# Patient Record
Sex: FEMALE | URBAN - NONMETROPOLITAN AREA
[De-identification: names, ages, dates, MRNs, and addresses within clinical notes are randomized per-mention and may not be internally consistent; named-entity substitution may affect disease eponyms.]

---

## 2017-10-05 ENCOUNTER — APPOINTMENT (RX ONLY)
Dept: URBAN - NONMETROPOLITAN AREA CLINIC 22 | Facility: CLINIC | Age: 62
Setting detail: DERMATOLOGY
End: 2017-10-05

## 2017-10-05 DIAGNOSIS — L71.8 OTHER ROSACEA: ICD-10-CM

## 2017-10-05 PROCEDURE — ? TREATMENT REGIMEN

## 2017-10-05 PROCEDURE — ? EDUCATIONAL RESOURCES PROVIDED

## 2017-10-05 PROCEDURE — 99202 OFFICE O/P NEW SF 15 MIN: CPT

## 2017-10-05 PROCEDURE — ? COUNSELING

## 2017-10-05 PROCEDURE — ? FOLLOW UP FOR NEXT VISIT

## 2017-10-05 ASSESSMENT — LOCATION SIMPLE DESCRIPTION DERM
LOCATION SIMPLE: RIGHT CHEEK
LOCATION SIMPLE: RIGHT LIP
LOCATION SIMPLE: LEFT CHEEK
LOCATION SIMPLE: LEFT LIP
LOCATION SIMPLE: NOSE

## 2017-10-05 ASSESSMENT — LOCATION ZONE DERM
LOCATION ZONE: FACE
LOCATION ZONE: LIP
LOCATION ZONE: NOSE

## 2017-10-05 ASSESSMENT — LOCATION DETAILED DESCRIPTION DERM
LOCATION DETAILED: LEFT UPPER CUTANEOUS LIP
LOCATION DETAILED: NASAL SUPRATIP
LOCATION DETAILED: LEFT INFERIOR CENTRAL MALAR CHEEK
LOCATION DETAILED: RIGHT UPPER CUTANEOUS LIP
LOCATION DETAILED: RIGHT INFERIOR MEDIAL MALAR CHEEK

## 2017-10-05 ASSESSMENT — SEVERITY ASSESSMENT OVERALL AMONG ALL PATIENTS
IN YOUR EXPERIENCE, AMONG ALL PATIENTS YOU HAVE SEEN WITH THIS CONDITION, HOW SEVERE IS THIS PATIENT'S CONDITION?: MILD TO MODERATE

## 2017-10-05 NOTE — PROCEDURE: TREATMENT REGIMEN
Initiate Treatment: Begin Previously prescribed by outside: \\n- metronidazole gel 0.75% - Apply to the face daily \\n- Doxycycline 100mg - 1 PO Daily
Plan: Discussed diagnosis in depth with patient at this time. Informed patient of the risks of using Betamethasone on the face. Informed patient to discontinue this medication at this time. Discussed with patient that she is on the correct medications for treatment of this condition. Informed patient that it may take some time prior to seeing symptom improvement. Informed patient to continue to use Doxycycline and Metrogel as previously directed. Informed patient that her treatment plan can be modified at follow up appointment, if her symptoms fail to improve. Patient voiced understanding
Discontinue Regimen: Previously prescribed by outside provider:\\n- Betamethasone Dipropionate
Detail Level: Zone

## 2017-10-05 NOTE — HPI: RASH
How Severe Is Your Rash?: mild
Is This A New Presentation, Or A Follow-Up?: Rash
Additional History: Patient states that she was previously diagnosed with Rosacea and she was given Metronidazole 0.75% gel and Doxycycline Mono 100mg capsules. Patient reports that she was seen by her PCP on 10-2-17 and given Betamethasone to apply to this rash. Patient reports that she has not seen any improvement with this medication at this time.